# Patient Record
Sex: FEMALE | Race: WHITE | URBAN - METROPOLITAN AREA
[De-identification: names, ages, dates, MRNs, and addresses within clinical notes are randomized per-mention and may not be internally consistent; named-entity substitution may affect disease eponyms.]

---

## 2024-06-20 ENCOUNTER — OFFICE VISIT (OUTPATIENT)
Age: 19
End: 2024-06-20

## 2024-06-20 VITALS
BODY MASS INDEX: 33.79 KG/M2 | HEART RATE: 95 BPM | WEIGHT: 236 LBS | OXYGEN SATURATION: 98 % | TEMPERATURE: 98.2 F | DIASTOLIC BLOOD PRESSURE: 80 MMHG | HEIGHT: 70 IN | SYSTOLIC BLOOD PRESSURE: 128 MMHG

## 2024-06-20 DIAGNOSIS — J01.10 ACUTE FRONTAL SINUSITIS, RECURRENCE NOT SPECIFIED: Primary | ICD-10-CM

## 2024-06-20 DIAGNOSIS — J02.9 SORE THROAT: ICD-10-CM

## 2024-06-20 LAB — STREPTOCOCCUS A RNA: NORMAL

## 2024-06-20 RX ORDER — PREDNISONE 20 MG/1
20 TABLET ORAL DAILY
Qty: 5 TABLET | Refills: 0 | Status: SHIPPED | OUTPATIENT
Start: 2024-06-20 | End: 2024-06-25

## 2024-06-20 RX ORDER — AMOXICILLIN AND CLAVULANATE POTASSIUM 875; 125 MG/1; MG/1
1 TABLET, FILM COATED ORAL 2 TIMES DAILY
Qty: 14 TABLET | Refills: 0 | Status: SHIPPED | OUTPATIENT
Start: 2024-06-20 | End: 2024-06-27

## 2024-06-20 RX ORDER — SPIRONOLACTONE 25 MG/1
25 TABLET ORAL DAILY
COMMUNITY

## 2024-06-20 RX ORDER — HYDROXYZINE HYDROCHLORIDE 10 MG/1
10 TABLET, FILM COATED ORAL 3 TIMES DAILY PRN
COMMUNITY

## 2024-06-20 NOTE — PATIENT INSTRUCTIONS
Thank you for allowing us to care for you today and we hope you feel better soon  OTC allergy medicine is helpful but will not cure symptoms

## 2024-06-20 NOTE — PROGRESS NOTES
Laura Martinez (:  2005) is a 19 y.o. female,New patient, here for evaluation of the following chief complaint(s):  Pharyngitis (Sore throat, hurts to swallow, bilateral ear pain x friday)      ASSESSMENT/PLAN:    ICD-10-CM    1. Acute frontal sinusitis, recurrence not specified  J01.10 amoxicillin-clavulanate (AUGMENTIN) 875-125 MG per tablet     predniSONE (DELTASONE) 20 MG tablet      2. Sore throat  J02.9 POCT Rapid Strep A DNA (Alere i)        Results for POC orders placed in visit on 24   POCT Rapid Strep A DNA (Alere i)   Result Value Ref Range    Streptococcus A RNA neg         Dx Disposition: sinusitis  Education and handout provided on diagnosis and management of symptoms.   AVS reviewed with patient. Follow up as needed in UC or with PCP for new or worsening symptoms.   Return if symptoms worsen or fail to improve.    SUBJECTIVE/OBJECTIVE:  Patient presents today with complaints of sore throat and ear pain that started 7 days ago      History provided by:  Patient   used: No    Pharyngitis  Location:  Throat  Severity:  Moderate  Onset quality:  Sudden  Duration:  8 days  Timing:  Constant  Progression:  Worsening      Vitals:    24 1802   BP: 128/80   Site: Right Upper Arm   Position: Sitting   Cuff Size: Large Adult   Pulse: 95   Temp: 98.2 °F (36.8 °C)   TempSrc: Oral   SpO2: 98%   Weight: 107 kg (236 lb)   Height: 1.778 m (5' 10\")       Review of Systems    Physical Exam  Constitutional:       Appearance: Normal appearance.   HENT:      Head: Normocephalic.      Right Ear: Tympanic membrane is bulging.      Left Ear: Tympanic membrane is bulging.      Nose: Nose normal.      Mouth/Throat:      Mouth: Mucous membranes are moist.      Pharynx: Oropharynx is clear. Posterior oropharyngeal erythema (PND noted) present.   Cardiovascular:      Rate and Rhythm: Normal rate and regular rhythm.      Heart sounds: Normal heart sounds.   Pulmonary:      Effort: Pulmonary